# Patient Record
Sex: FEMALE | Race: WHITE | NOT HISPANIC OR LATINO | Employment: FULL TIME | URBAN - METROPOLITAN AREA
[De-identification: names, ages, dates, MRNs, and addresses within clinical notes are randomized per-mention and may not be internally consistent; named-entity substitution may affect disease eponyms.]

---

## 2021-10-18 ENCOUNTER — LAB REQUISITION (OUTPATIENT)
Dept: LAB | Facility: CLINIC | Age: 26
End: 2021-10-18

## 2021-10-18 PROCEDURE — 86765 RUBEOLA ANTIBODY: CPT | Performed by: INTERNAL MEDICINE

## 2021-10-18 PROCEDURE — 86735 MUMPS ANTIBODY: CPT | Performed by: INTERNAL MEDICINE

## 2021-10-18 PROCEDURE — 86706 HEP B SURFACE ANTIBODY: CPT | Performed by: INTERNAL MEDICINE

## 2021-10-18 PROCEDURE — 86762 RUBELLA ANTIBODY: CPT | Performed by: INTERNAL MEDICINE

## 2021-10-18 PROCEDURE — 86481 TB AG RESPONSE T-CELL SUSP: CPT | Performed by: INTERNAL MEDICINE

## 2021-10-18 PROCEDURE — 86787 VARICELLA-ZOSTER ANTIBODY: CPT | Performed by: INTERNAL MEDICINE

## 2021-10-19 LAB
GAMMA INTERFERON BACKGROUND BLD IA-ACNC: 0.09 IU/ML
HBV SURFACE AB SERPL IA-ACNC: 405.43 M[IU]/ML
M TB IFN-G BLD-IMP: NEGATIVE
M TB IFN-G CD4+ BCKGRND COR BLD-ACNC: 9.91 IU/ML
MEV IGG SER IA-ACNC: 95.8 AU/ML
MEV IGG SER IA-ACNC: POSITIVE
MITOGEN IGNF BCKGRD COR BLD-ACNC: -0.01 IU/ML
MITOGEN IGNF BCKGRD COR BLD-ACNC: 0 IU/ML
MUMPS ANTIBODY IGG INSTRUMENT VALUE: 145 AU/ML
MUV IGG SER QL IA: POSITIVE
QUANTIFERON MITOGEN: 10 IU/ML
QUANTIFERON NIL TUBE: 0.09 IU/ML
QUANTIFERON TB1 TUBE: 0.08 IU/ML
QUANTIFERON TB2 TUBE: 0.09
RUBV IGG SERPL QL IA: 5.04 INDEX
RUBV IGG SERPL QL IA: POSITIVE
VZV IGG SER QL IA: 1634 INDEX
VZV IGG SER QL IA: POSITIVE

## 2022-09-22 ENCOUNTER — OFFICE VISIT (OUTPATIENT)
Dept: OBGYN | Facility: CLINIC | Age: 27
End: 2022-09-22
Payer: COMMERCIAL

## 2022-09-22 VITALS
HEIGHT: 64 IN | WEIGHT: 185.2 LBS | BODY MASS INDEX: 31.62 KG/M2 | SYSTOLIC BLOOD PRESSURE: 114 MMHG | DIASTOLIC BLOOD PRESSURE: 70 MMHG

## 2022-09-22 DIAGNOSIS — Z30.011 ENCOUNTER FOR INITIAL PRESCRIPTION OF CONTRACEPTIVE PILLS: Primary | ICD-10-CM

## 2022-09-22 DIAGNOSIS — Z01.419 ENCOUNTER FOR GYNECOLOGICAL EXAMINATION (GENERAL) (ROUTINE) WITHOUT ABNORMAL FINDINGS: ICD-10-CM

## 2022-09-22 DIAGNOSIS — Z12.4 SCREENING FOR CERVICAL CANCER: ICD-10-CM

## 2022-09-22 PROCEDURE — 87591 N.GONORRHOEAE DNA AMP PROB: CPT | Performed by: FAMILY MEDICINE

## 2022-09-22 PROCEDURE — G0145 SCR C/V CYTO,THINLAYER,RESCR: HCPCS | Performed by: FAMILY MEDICINE

## 2022-09-22 PROCEDURE — 87491 CHLMYD TRACH DNA AMP PROBE: CPT | Performed by: FAMILY MEDICINE

## 2022-09-22 PROCEDURE — 99385 PREV VISIT NEW AGE 18-39: CPT | Performed by: FAMILY MEDICINE

## 2022-09-22 RX ORDER — LEVONORGESTREL AND ETHINYL ESTRADIOL 0.1-0.02MG
1 KIT ORAL DAILY
COMMUNITY
Start: 2022-07-18 | End: 2022-09-22

## 2022-09-22 RX ORDER — LEVONORGESTREL AND ETHINYL ESTRADIOL 0.1-0.02MG
1 KIT ORAL DAILY
Qty: 90 TABLET | Refills: 3 | Status: SHIPPED | OUTPATIENT
Start: 2022-09-22 | End: 2023-08-22

## 2022-09-22 NOTE — PROGRESS NOTES
"SUBJECTIVE:  Tarah Davalos is an 26 year old  woman who presents for   annual gyn exam. Patient's last menstrual period was 2022 (exact date). Periods are regular q 28-30 days, lasting   4 days. Dysmenorrhea:mild, occurring premenstrually and first 1-2 days of flow. Cyclic symptoms   include none. No intermenstrual bleeding,   spotting, or discharge.  Menarche age teenager.  STD hx: none.    Current contraception: oral contraceptives  MIAN exposure: no  History of abnormal Pap smear: Yes: unsure of sherrings  Family history of uterine or ovarian cancer: No  Regular self breast exam: yes  History of abnormal mammogram: No  Family history of breast cancer: No  History of abnormal lipids: No    No past medical history on file.     No family history on file.    No past surgical history on file.    Current Outpatient Medications   Medication     LESSINA-28 0.1-20 MG-MCG tablet     No current facility-administered medications for this visit.     Allergies   Allergen Reactions     Cephalexin Rash       Social History     Tobacco Use     Smoking status: Never Smoker     Smokeless tobacco: Never Used   Substance Use Topics     Alcohol use: Not on file       Review Of Systems  Ears/Nose/Throat: negative  Respiratory: No shortness of breath, dyspnea on exertion, cough, or hemoptysis  Cardiovascular: negative  Gastrointestinal: negative  Genitourinary: See HPI   Constitutional, HEENT, cardiovascular, pulmonary, GI, , musculoskeletal, neuro, skin, endocrine and psych systems are negative, except as otherwise noted.      OBJECTIVE:  /70   Ht 1.626 m (5' 4\")   Wt 84 kg (185 lb 3.2 oz)   LMP 2022 (Exact Date)   BMI 31.79 kg/m      General appearance: healthy, alert and no distress  Skin: Skin color, texture, turgor normal. No rashes or lesions.  Ears: negative  Nose/Sinuses: Nares normal. Septum midline. Mucosa normal. No drainage or sinus tenderness.  Oropharynx: Lips, mucosa, and tongue normal. Teeth " and gums normal.  Neck: Neck supple. No adenopathy. Thyroid symmetric, normal size,, Carotids without bruits.  Lungs: negative, Percussion normal. Good diaphragmatic excursion. Lungs clear  Heart: negative, PMI normal. No lifts, heaves, or thrills. RRR. No murmurs, clicks gallops or rub  Breasts: Inspection negative. No nipple discharge or bleeding. No masses.  Abdomen: Abdomen soft, non-tender. BS normal. No masses, organomegaly  Pelvic: Pelvic:  Pelvic examination with  pap/yes Gonorrhea and Chlamydia   including  External genitalia normal   and vagina normal rugatted not atrophic  Examination of urethra  normal no masses, tenderness, scarring  bladder, no masses or tenderness  Cervix  no lesions or discharge  Bimanual exam with   Uterus 6 weeks size, mid position, mobile,non-tenderness, normal no descent   Adnexa/parametria  Normal no masses       ASSESSMENT:  Tarah Davalos is an 26 year old  woman who presents for   annual gyn exam. Concerns:  None today     PLAN:  Dx:  1)  Pap smear done today   Gonorrhea  Chlamydia done today  2)  Mammography  Not due, lipids at appropriate intervals not due   Colonoscopy not due  3)  Covid-19 concerns: covid infection and vaccination recommendations discussed.   4)  Contraception: refill Oral Contraceptive       PE:  Reviewed health maintenance including diet, regular exercise   and periodic exams.    Dr. Ileana Dobson, DO    Obstetrics and Gynecology  [facility] and [place]

## 2022-09-22 NOTE — NURSING NOTE
"Chief Complaint   Patient presents with     Gyn Exam     Pap due     Contraception     Needs refill for birth control pills       Initial /70   Ht 1.626 m (5' 4\")   Wt 84 kg (185 lb 3.2 oz)   LMP 2022 (Exact Date)   BMI 31.79 kg/m   Estimated body mass index is 31.79 kg/m  as calculated from the following:    Height as of this encounter: 1.626 m (5' 4\").    Weight as of this encounter: 84 kg (185 lb 3.2 oz).  BP completed using cuff size: regular    Questioned patient about current smoking habits.  Pt. has never smoked.          The following HM Due: pap smear      "

## 2022-09-23 LAB
C TRACH DNA SPEC QL NAA+PROBE: NEGATIVE
N GONORRHOEA DNA SPEC QL NAA+PROBE: NEGATIVE

## 2022-09-26 LAB
BKR LAB AP GYN ADEQUACY: NORMAL
BKR LAB AP GYN INTERPRETATION: NORMAL
BKR LAB AP HPV REFLEX: NORMAL
BKR LAB AP LMP: NORMAL
BKR LAB AP PREVIOUS ABNORMAL: NORMAL
PATH REPORT.COMMENTS IMP SPEC: NORMAL
PATH REPORT.COMMENTS IMP SPEC: NORMAL
PATH REPORT.RELEVANT HX SPEC: NORMAL

## 2022-12-02 ENCOUNTER — HOSPITAL ENCOUNTER (EMERGENCY)
Facility: CLINIC | Age: 27
Discharge: HOME | End: 2022-12-02
Attending: EMERGENCY MEDICINE | Admitting: EMERGENCY MEDICINE
Payer: COMMERCIAL

## 2022-12-02 VITALS
DIASTOLIC BLOOD PRESSURE: 94 MMHG | RESPIRATION RATE: 18 BRPM | TEMPERATURE: 98 F | HEART RATE: 100 BPM | OXYGEN SATURATION: 98 % | SYSTOLIC BLOOD PRESSURE: 147 MMHG

## 2022-12-02 DIAGNOSIS — H10.32 ACUTE CONJUNCTIVITIS OF LEFT EYE, UNSPECIFIED ACUTE CONJUNCTIVITIS TYPE: ICD-10-CM

## 2022-12-02 PROCEDURE — 250N000009 HC RX 250: Performed by: EMERGENCY MEDICINE

## 2022-12-02 PROCEDURE — 99284 EMERGENCY DEPT VISIT MOD MDM: CPT

## 2022-12-02 RX ORDER — CIPROFLOXACIN HYDROCHLORIDE 3.5 MG/ML
1-2 SOLUTION/ DROPS TOPICAL EVERY 4 HOURS
Qty: 1.8 ML | Refills: 0 | Status: SHIPPED | OUTPATIENT
Start: 2022-12-02 | End: 2022-12-05

## 2022-12-02 RX ADMIN — FLUORESCEIN SODIUM 1 STRIP: 1 STRIP OPHTHALMIC at 17:57

## 2022-12-02 ASSESSMENT — ENCOUNTER SYMPTOMS
EYE PAIN: 0
EYE DISCHARGE: 0
EYE ITCHING: 0
EYE REDNESS: 1

## 2022-12-02 NOTE — ED PROVIDER NOTES
History   Chief Complaint:  Eye Problem    The history is provided by the patient.      Tarah Davalos is a 27 year old female who presents with eye problem. The patient reports onset of left eye redness today with unknown cause. States that she is also experiencing nasal congestion. Adds that her eye was slightly crusted when she woke up this morning. Notes that she got a small amount of her normal shampoo into her eye last night, but rinsed it out and had no reaction. Denies foreign body in eye, scratch to eye, eye itching, visual disturbance, and eye pain.  No fever sore throat or cough.  No eye contact lens wear.    Review of Systems   HENT: Positive for congestion.    Eyes: Positive for redness. Negative for pain, discharge, itching and visual disturbance.     Allergies:  Cephalexin    Medications:  Lessina    Past Medical History:     Past medical history reviewed; there is no pertinent medical history    Social History:  Presents alone  Presents via private vehicle     Physical Exam     Patient Vitals for the past 24 hrs:   BP Temp Temp src Pulse Resp SpO2   12/02/22 1739 (!) 147/94 98  F (36.7  C) Oral 100 18 98 %     Physical Exam    Gen: Resting comfortably   Eyes: Right eye periorbital tissues unremarkable.  Left eye 4 mm reactive pupil, extraocular movements intact, sclera injected, clear tearing present, chemosis present.  On fluorescein staining there is no dye uptake.  No foreign bodies seen.  CV: ppi, regular   Resp: speaking in full sentences without any resp distress  Skin: warm dry well perfused  Neuro: Alert, no gross motor or sensory deficits,  gait stable        Emergency Department Course   Emergency Department Course:  Reviewed:  I reviewed nursing notes, vitals, past medical history and Care Everywhere    Assessments:  1755 I obtained history and examined the patient as noted above.     Interventions:  1757 Fluorescein opthalmic strip, 1 strip, Left eye    Disposition:  The patient was  discharged to home.     Impression & Plan   Medical Decision Makin-year-old female here with left eye changes likely representing conjunctivitis.  Fluorescein staining shows no corneal abrasion.  Ocular exam shows no foreign body.  Will assume this is possibly infectious and discharged home with antibiotic eyedrops.  Also give symptomatic care with additional drops.  She was comfortable agreeable to plan CI if not improving expected return to ER if worse.    Diagnosis:    ICD-10-CM    1. Acute conjunctivitis of left eye, unspecified acute conjunctivitis type  H10.32         Discharge Medications:  New Prescriptions    CIPROFLOXACIN (CILOXAN) 0.3 % OPHTHALMIC SOLUTION    Place 1-2 drops Into the left eye every 4 hours for 3 days    NAPHAZOLINE-PHENIRAMINE (NAPHCON-A) 0.025-0.3 % OPHTHALMIC SOLUTION    Place 1-2 drops Into the left eye 3 times daily as needed for dry eyes     Scribe Disclosure:  Eryn OLMEDO, am serving as a scribe at 5:56 PM on 2022 to document services personally performed by Edilson Plata MD based on my observations and the provider's statements to me.     Edilson Plata MD  [phone]

## 2022-12-02 NOTE — ED TRIAGE NOTES
Pt arrives with c/o left eye redness and nasal congestion. Pt denies pain.     Triage Assessment     Row Name 12/02/22 8553       Triage Assessment (Adult)    Airway WDL WDL       Respiratory WDL    Respiratory WDL WDL       Skin Circulation/Temperature WDL    Skin Circulation/Temperature WDL WDL       Cardiac WDL    Cardiac WDL WDL       Peripheral/Neurovascular WDL    Peripheral Neurovascular WDL WDL       Cognitive/Neuro/Behavioral WDL    Cognitive/Neuro/Behavioral WDL WDL

## 2023-08-22 DIAGNOSIS — Z30.011 ENCOUNTER FOR INITIAL PRESCRIPTION OF CONTRACEPTIVE PILLS: ICD-10-CM

## 2023-08-22 RX ORDER — LEVONORGESTREL AND ETHINYL ESTRADIOL 0.1-0.02MG
1 KIT ORAL DAILY
Qty: 84 TABLET | Refills: 0 | Status: SHIPPED | OUTPATIENT
Start: 2023-08-22 | End: 2023-10-11

## 2023-08-22 NOTE — TELEPHONE ENCOUNTER
Prescription approved per [facility] Refill Protocol.  Pt has an appt scheduled for 10/11/23.    Joan ROGER RN

## 2023-10-11 ENCOUNTER — OFFICE VISIT (OUTPATIENT)
Dept: OBGYN | Facility: CLINIC | Age: 28
End: 2023-10-11
Payer: COMMERCIAL

## 2023-10-11 VITALS
SYSTOLIC BLOOD PRESSURE: 138 MMHG | DIASTOLIC BLOOD PRESSURE: 90 MMHG | BODY MASS INDEX: 34.93 KG/M2 | WEIGHT: 204.6 LBS | HEIGHT: 64 IN

## 2023-10-11 DIAGNOSIS — Z30.011 ENCOUNTER FOR INITIAL PRESCRIPTION OF CONTRACEPTIVE PILLS: ICD-10-CM

## 2023-10-11 PROCEDURE — 99395 PREV VISIT EST AGE 18-39: CPT | Performed by: FAMILY MEDICINE

## 2023-10-11 RX ORDER — LEVONORGESTREL/ETHIN.ESTRADIOL 0.1-0.02MG
1 TABLET ORAL DAILY
Qty: 84 TABLET | Refills: 4 | Status: SHIPPED | OUTPATIENT
Start: 2023-10-11

## 2023-10-11 NOTE — PROGRESS NOTES
"SUBJECTIVE:  Tarah Davalos is an 28 year old  woman who presents for   annual gyn exam. Patient's last menstrual period was 10/04/2023 (exact date). Periods are regular q 28-30 days, lasting   5 days. Dysmenorrhea:mild, occurring premenstrually and first 1-2 days of flow. Cyclic symptoms   include none. No intermenstrual bleeding,   spotting, or discharge.  Menarche age teenager.  STD hx: none.    Current contraception: oral contraceptives  MIAN exposure: no  History of abnormal Pap smear: No  Family history of uterine or ovarian cancer: No  Regular self breast exam: Yes  History of abnormal mammogram: No  Family history of breast cancer: No  History of abnormal lipids: No    History reviewed. No pertinent past medical history.     History reviewed. No pertinent family history.    History reviewed. No pertinent surgical history.    Current Outpatient Medications   Medication    levonorgestrel-ethinyl estradiol (LESSINA-28) 0.1-20 MG-MCG tablet    naphazoline-pheniramine (NAPHCON-A) 0.025-0.3 % ophthalmic solution     No current facility-administered medications for this visit.     Allergies   Allergen Reactions    Cephalexin Rash       Social History     Tobacco Use    Smoking status: Never    Smokeless tobacco: Never   Substance Use Topics    Alcohol use: Not on file       Review Of Systems  Ears/Nose/Throat: negative  Respiratory: No shortness of breath, dyspnea on exertion, cough, or hemoptysis  Cardiovascular: negative  Gastrointestinal: negative  Genitourinary: See HPI   Constitutional, HEENT, cardiovascular, pulmonary, GI, , musculoskeletal, neuro, skin, endocrine and psych systems are negative, except as otherwise noted.      OBJECTIVE:  BP (!) 138/90   Ht 1.626 m (5' 4\")   Wt 92.8 kg (204 lb 9.6 oz)   LMP 10/04/2023 (Exact Date)   BMI 35.12 kg/m      General appearance: healthy, alert and no distress  Skin: Skin color, texture, turgor normal. No rashes or lesions.  Ears: negative  Nose/Sinuses: " Nares normal. Septum midline. Mucosa normal. No drainage or sinus tenderness.  Oropharynx: Lips, mucosa, and tongue normal. Teeth and gums normal.  Neck: Neck supple. No adenopathy. Thyroid symmetric, normal size,, Carotids without bruits.  Lungs: negative, Percussion normal. Good diaphragmatic excursion. Lungs clear  Heart: negative, PMI normal. No lifts, heaves, or thrills. RRR. No murmurs, clicks gallops or rub  Breasts: Inspection negative. No nipple discharge or bleeding. No masses.  Abdomen: Abdomen soft, non-tender. BS normal. No masses, organomegaly  Pelvic: Pelvic:  Pelvic examination with  pap/no Gonorrhea and Chlamydia   including  External genitalia normal   and vagina normal rugatted not atrophic  Examination of urethra  normal no masses, tenderness, scarring  bladder, no masses or tenderness  Cervix no lesions or discharge  Bimanual exam with   Uterus 6 weeks size, mid position, mobile,no-tenderness, normal no masses descent   Adnexa/parametria  normal no masses       ASSESSMENT:  Tarah Davalos is an 28 year old  woman who presents for   annual gyn exam.     PLAN:  Dx:  1)  Pap smear completed  Gonorrhea  Chlamydia declines  2)  Mammography not due, lipids at appropriate intervals will check age 30    Colonoscopy not due  3)  Contraception: refill Oral Contraceptive       PE:  Reviewed health maintenance including diet, regular exercise   and periodic exams.    Dr. Ileana Dobson, DO    Obstetrics and Gynecology  [facility] and [place]

## 2023-10-11 NOTE — NURSING NOTE
"Chief Complaint   Patient presents with    Gyn Exam     Refill on OCP       Initial BP (!) 138/90   Ht 1.626 m (5' 4\")   Wt 92.8 kg (204 lb 9.6 oz)   LMP 10/04/2023 (Exact Date)   BMI 35.12 kg/m   Estimated body mass index is 35.12 kg/m  as calculated from the following:    Height as of this encounter: 1.626 m (5' 4\").    Weight as of this encounter: 92.8 kg (204 lb 9.6 oz).  BP completed using cuff size: regular long    Questioned patient about current smoking habits.  Pt. has never smoked.          The following HM Due: NONE    "

## 2024-06-04 ENCOUNTER — HOSPITAL ENCOUNTER (EMERGENCY)
Facility: CLINIC | Age: 29
Discharge: LEFT BEFORE DISCHARGE | End: 2024-06-04
Payer: COMMERCIAL

## 2024-06-04 ASSESSMENT — ACTIVITIES OF DAILY LIVING (ADL)
ADLS_ACUITY_SCORE: 35
ADLS_ACUITY_SCORE: 35

## 2024-09-06 ENCOUNTER — HOSPITAL ENCOUNTER (EMERGENCY)
Facility: CLINIC | Age: 29
Discharge: HOME | End: 2024-09-06
Attending: EMERGENCY MEDICINE | Admitting: EMERGENCY MEDICINE
Payer: OTHER MISCELLANEOUS

## 2024-09-06 ENCOUNTER — APPOINTMENT (OUTPATIENT)
Dept: GENERAL RADIOLOGY | Facility: CLINIC | Age: 29
End: 2024-09-06
Attending: EMERGENCY MEDICINE
Payer: OTHER MISCELLANEOUS

## 2024-09-06 VITALS
RESPIRATION RATE: 18 BRPM | TEMPERATURE: 98.2 F | DIASTOLIC BLOOD PRESSURE: 82 MMHG | OXYGEN SATURATION: 99 % | SYSTOLIC BLOOD PRESSURE: 120 MMHG | HEART RATE: 78 BPM

## 2024-09-06 DIAGNOSIS — S50.02XA CONTUSION OF LEFT ELBOW, INITIAL ENCOUNTER: ICD-10-CM

## 2024-09-06 PROCEDURE — 73080 X-RAY EXAM OF ELBOW: CPT | Mod: LT

## 2024-09-06 PROCEDURE — 99283 EMERGENCY DEPT VISIT LOW MDM: CPT

## 2024-09-06 RX ORDER — IBUPROFEN 600 MG/1
600 TABLET, FILM COATED ORAL ONCE
Status: COMPLETED | OUTPATIENT
Start: 2024-09-06 | End: 2024-09-06

## 2024-09-06 ASSESSMENT — COLUMBIA-SUICIDE SEVERITY RATING SCALE - C-SSRS
2. HAVE YOU ACTUALLY HAD ANY THOUGHTS OF KILLING YOURSELF IN THE PAST MONTH?: NO
1. IN THE PAST MONTH, HAVE YOU WISHED YOU WERE DEAD OR WISHED YOU COULD GO TO SLEEP AND NOT WAKE UP?: NO
6. HAVE YOU EVER DONE ANYTHING, STARTED TO DO ANYTHING, OR PREPARED TO DO ANYTHING TO END YOUR LIFE?: NO

## 2024-09-06 ASSESSMENT — ACTIVITIES OF DAILY LIVING (ADL): ADLS_ACUITY_SCORE: 35

## 2024-09-06 NOTE — ED PROVIDER NOTES
Emergency Department Note      Code Status: No Order    History of Present Illness     Chief Complaint:  Elbow Injury       DENNY Davalos is a 28 year old female who presents after injuring her left elbow.  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  She injured her left elbow on a bed rail.  This occurred when she was trying to disconnect an oxygen line from the wall fixture and when it gave way her left elbow rapidly moved downward and hit the handle on the ED gurney (at the head of the bed).  There is discomfort at the area but there is no specific functional loss noted..    Independent Historian:    None    Review of External Notes  None    Past Medical History   Medical History, Surgical History, Problem List, and Medications  Reviewed in Epic    Physical Exam   Patient Vitals for the past 24 hrs:   BP Temp Temp src Pulse Resp SpO2   09/06/24 1200 120/82 98.2  F (36.8  C) Temporal 78 18 99 %       Physical Exam  MSK: Normal ROM. There is mild point tenderness at the olecranon and the distal humeral condyles.   Skin: No elbow bruising noted  Neuro: Normal distal sensation.    Diagnostics     Laboratory: Imaging:   Labs Ordered and Resulted from Time of ED Arrival to Time of ED Departure - No data to display  Elbow  XR, G/E 3 views, left   Final Result   IMPRESSION: Normal.      JOHNNY MELISSA MD            SYSTEM ID:  [id number]              Independent Interpretation  See ED course    ED Course    Medications Administered  Medications   ibuprofen (ADVIL/MOTRIN) tablet 600 mg (600 mg Oral Not Given 9/6/24 1218)       Procedures  Procedures     Discussion of Management  See ED Course    ED Course  ED Course as of 09/06/24 1541   Fri Sep 06, 2024   1205 Initial evaluation.   1235 My interpretation: No displaced fracture noted.   1235 Rechecked and updated.    1244 Updated with final XR result.       Optional/Additional Documentation: None    Medical Decision Making / Diagnosis     MIPS      None    Medical Decision Making:  This 28-year-old presented after a traumatic injury of the left elbow.  Please see the HPI and exam for specifics.  X-ray did not demonstrate fracture.  I think discharge with supportive care (ice, ibuprofen, Tylenol, etc. is reasonable.    Critical Care:  None.    Disposition:  See ED Course and MDM    ICD-10 Codes:    ICD-10-CM    1. Contusion of left elbow, initial encounter  S50.02XA            Discharge Medications:  Discharge Medication List as of 9/6/2024 12:37 PM           9/6/2024   Mario Miller DO     [employer]                    Mario Miller DO  09/06/24 1541

## 2024-10-08 ENCOUNTER — OFFICE VISIT (OUTPATIENT)
Dept: OBGYN | Facility: CLINIC | Age: 29
End: 2024-10-08
Payer: COMMERCIAL

## 2024-10-08 VITALS — SYSTOLIC BLOOD PRESSURE: 142 MMHG | DIASTOLIC BLOOD PRESSURE: 82 MMHG | WEIGHT: 211.9 LBS | BODY MASS INDEX: 36.37 KG/M2

## 2024-10-08 DIAGNOSIS — Z30.011 ENCOUNTER FOR INITIAL PRESCRIPTION OF CONTRACEPTIVE PILLS: ICD-10-CM

## 2024-10-08 DIAGNOSIS — R03.0 ELEVATED BP WITHOUT DIAGNOSIS OF HYPERTENSION: ICD-10-CM

## 2024-10-08 DIAGNOSIS — Z01.419 WOMEN'S ANNUAL ROUTINE GYNECOLOGICAL EXAMINATION: Primary | ICD-10-CM

## 2024-10-08 PROCEDURE — 99213 OFFICE O/P EST LOW 20 MIN: CPT | Mod: 25 | Performed by: STUDENT IN AN ORGANIZED HEALTH CARE EDUCATION/TRAINING PROGRAM

## 2024-10-08 PROCEDURE — 99395 PREV VISIT EST AGE 18-39: CPT | Performed by: STUDENT IN AN ORGANIZED HEALTH CARE EDUCATION/TRAINING PROGRAM

## 2024-10-08 RX ORDER — ACETAMINOPHEN AND CODEINE PHOSPHATE 120; 12 MG/5ML; MG/5ML
0.35 SOLUTION ORAL DAILY
Qty: 112 TABLET | Refills: 3 | Status: SHIPPED | OUTPATIENT
Start: 2024-10-08

## 2024-10-08 NOTE — PROGRESS NOTES
[facility]  Annual Health Maintenance Visit  Date: 10/08/2024    CC: Annual exam    HPI:  Tarah Davalos is a 29 year old  who is here for annual GYN exam. 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    Concerns today:  - No major concerns today.    History of elevated BP without diagnosis of HTN   Vital Signs     Systolic Diastolic   2022  12:57   70    2022  5:39  !  94 (H)    10/11/2023  3:28  !  90 !    2024  12:00   82    10/8/2024  1:36  !  82 !       Preventative Health Assessment:  Tobacco use: No  Alcohol use: Social  Drug use: No  Abuse: Denies physical, sexual, or verbal abuse. Feels safe in current relationship  Depression screening: Negative    Ob/Gyn History:    Patient's last menstrual period was 10/02/2024 (exact date)..   Menses are every 28-30 days, regular. Bleeding lasts for 4 days, moderate. No missed periods, bleeding between periods, bleeding after intercourse.  Dysmenorrhea: No painful menses  Sexually Active: Yes  Sexual Partner: 1 male partner  Dyspareunia: No  Contraception: COCP  Discharge: None bothersome, no itching  Last pap smear: . Result: NILM  Any history of abnormal pap: No  History of STDs:No  Incontinence: No    Immunizations:    Immunization History   Administered Date(s) Administered    COVID-19 12+ (MODERNA) 2023    COVID-19 MONOVALENT 12+ (Pfizer) 2021, 2021, 10/18/2021    HPV Quadrivalent 08/10/2012, 10/02/2012, 2013    Hep B, Adult (Heplisav- B) 2020    Influenza (H1N1) 2010    Influenza (IIV3) PF 2015, 2016    Influenza Vaccine >6 months,quad, PF 10/20/2020, 10/17/2021    Meningococcal ACWY (Menactra ) 2014    TD,PF 7+ (Tenivac) 2018     PMH:  No past medical history on file.    Prob List:  There is no problem list on file for this patient.      PSH:  No past surgical history on file.    Allergies:    Allergies   Allergen  Reactions    Cephalexin Rash       Medications:    Current Outpatient Medications   Medication Sig Dispense Refill    levonorgestrel-ethinyl estradiol (LESSINA-28) 0.1-20 MG-MCG tablet Take 1 tablet by mouth daily 84 tablet 4    naphazoline-pheniramine (NAPHCON-A) 0.025-0.3 % ophthalmic solution Place 1-2 drops Into the left eye 3 times daily as needed for dry eyes 5 mL 0     No current facility-administered medications for this visit.       FH:  No family history on file.    SH:    Social History     Tobacco Use    Smoking status: Never    Smokeless tobacco: Never   Substance Use Topics    Drug use: Never       History   Smoking Status    Never   Smokeless Tobacco    Never       Review of systems:  With the exception of any items noted above, the 10 point ROS was negative.    O:  BP (!) 142/82 (BP Location: Left arm, Cuff Size: Adult Regular)   Wt 96.1 kg (211 lb 14.4 oz)   LMP 10/02/2024 (Exact Date)   BMI 36.37 kg/m    GEN: Alert, oriented x3, NAD  HEENT: Atraumatic/normocephalic, pupils mid-sized  NECK: Normal ROM  CV: Well perfused  PULM: Normal work of breathing  BREAST: Bilaterally without focal masses, lesions, dimpling, rash; no axillary lymphadenopathy, no expressible nipple discharge. Exam chaperoned by Beatriz Adler MA  ABD: Soft, non-distended, and non-tender without guarding or rebound  : NEFG. Vagina is without lesions. Physiologic discharge seen. Vaginal walls estrogenized and rugated. Bladder is nontender. Cervix normal appearing, nulliparous. On bimanual the uterus is difficult to palpate due to habitus but is mobile. No cervical motion tenderness. No adnexal masses or tenderness. Exam chaperoned by Beatriz Adler MA.  EXT: Normal extremities, grossly normal ROM  SKIN: Warm and dry, skin color normal  NEURO: Speech clear, CNs grossly intact, moves all extremities appropriately  PSYCH: Appropriate affect    A/P:  Tarah Davalos is a 29 year old  who presents for annual exam.   Age  appropriate counseling.  Other issues addressed today as below.    #Screening  - GC/CT: Decline  - Cervical cancer: Pap smear current  - Clinical breast exam: Unremarkable    #Immunizations  - Flu complete  - COVID until November  - HPV s/p 4-valent series    #Contraception  #Elevated BP  - Currently on COCP. Has had several elevated BP in the past, also elevated today. Discussed COCP as Category 3 per [facility]. With these elevated BP and also obesity recommend considering non-estrogen based contraception. Reviewed options and she would like POP. She is also considering Mirena IUD and will let us know if changes her mind to that  - Rx for Micronor  - Recommend taking BP once a week writing these values down as this will give more insight into her BP. 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     Follow up in 1 year, sooner if questions or concerns.    Juan Hebert MD [facility]  10/08/2024 1:42 PM